# Patient Record
Sex: MALE | Race: WHITE | URBAN - METROPOLITAN AREA
[De-identification: names, ages, dates, MRNs, and addresses within clinical notes are randomized per-mention and may not be internally consistent; named-entity substitution may affect disease eponyms.]

---

## 2024-07-15 ENCOUNTER — OFFICE VISIT (OUTPATIENT)
Dept: FAMILY MEDICINE CLINIC | Facility: CLINIC | Age: 39
End: 2024-07-15

## 2024-07-15 VITALS
WEIGHT: 254.38 LBS | DIASTOLIC BLOOD PRESSURE: 86 MMHG | HEIGHT: 69 IN | HEART RATE: 94 BPM | RESPIRATION RATE: 16 BRPM | TEMPERATURE: 99 F | OXYGEN SATURATION: 98 % | SYSTOLIC BLOOD PRESSURE: 134 MMHG | BODY MASS INDEX: 37.68 KG/M2

## 2024-07-15 DIAGNOSIS — J02.9 SORE THROAT: ICD-10-CM

## 2024-07-15 DIAGNOSIS — J34.89 SINUS PRESSURE: ICD-10-CM

## 2024-07-15 DIAGNOSIS — H66.011 NON-RECURRENT ACUTE SUPPURATIVE OTITIS MEDIA OF RIGHT EAR WITH SPONTANEOUS RUPTURE OF TYMPANIC MEMBRANE: Primary | ICD-10-CM

## 2024-07-15 DIAGNOSIS — H93.8X2 EAR PRESSURE, LEFT: ICD-10-CM

## 2024-07-15 PROCEDURE — 99202 OFFICE O/P NEW SF 15 MIN: CPT | Performed by: NURSE PRACTITIONER

## 2024-07-15 RX ORDER — AMOXICILLIN AND CLAVULANATE POTASSIUM 875; 125 MG/1; MG/1
1 TABLET, FILM COATED ORAL 2 TIMES DAILY
Qty: 20 TABLET | Refills: 0 | Status: SHIPPED | OUTPATIENT
Start: 2024-07-15 | End: 2024-07-25

## 2024-07-15 NOTE — PATIENT INSTRUCTIONS
Take Augmentin antibiotic as prescribed - finish all 10 days  Motrin/Tylenol as needed for pain  Push fluids  Return to care for new/worsening symptoms

## 2024-07-15 NOTE — PROGRESS NOTES
Subjective:   Patient ID: Eugene Ng is a 38 year old male.    Patient is a 38 year old male who presents today with complaints of sore throat and sinus pressure x 5 days. Yesterday developed right ear pressure and bloody discharge. This morning notes ear pain is worsening, continues to have clear discharge and now having left ear pressure. Noticed white spots in the back of his throat this morning. Denies fevers, body aches, chills, cough, SOB, chest pain, congestion, runny nose, n/v/d or abdominal pain. Tolerating PO well at home. Attempted treatment prior to arrival = Motrin and Ami Franklin. No ill contacts in the home.         History/Other:   Review of Systems   Constitutional:  Negative for appetite change, chills and fever.   HENT:  Positive for ear discharge, ear pain (pressure), sinus pressure and sore throat. Negative for congestion and rhinorrhea.    Respiratory:  Negative for cough and shortness of breath.    Cardiovascular:  Negative for chest pain.   Gastrointestinal:  Negative for abdominal pain, diarrhea, nausea and vomiting.   Musculoskeletal:  Negative for myalgias.     Current Outpatient Medications   Medication Sig Dispense Refill    amoxicillin clavulanate 875-125 MG Oral Tab Take 1 tablet by mouth 2 (two) times daily for 10 days. 20 tablet 0     Allergies:No Known Allergies    /86   Pulse 94   Temp 98.5 °F (36.9 °C)   Resp 16   Ht 5' 9\" (1.753 m)   Wt 254 lb 6.4 oz (115.4 kg)   SpO2 98%   BMI 37.57 kg/m²       Objective:   Physical Exam  Vitals reviewed.   Constitutional:       General: He is awake. He is not in acute distress.     Appearance: Normal appearance. He is well-developed and well-groomed. He is not ill-appearing, toxic-appearing or diaphoretic.   HENT:      Head: Normocephalic and atraumatic.      Right Ear: Ear canal and external ear normal. Tympanic membrane is injected, erythematous and bulging.      Left Ear: Tympanic membrane, ear canal and external ear normal.       Ears:      Comments: Unable to visualize any perforation however there is some fluid sitting in the back of the ear canal against the TM. No erythema, debris or tenderness to canal.      Nose: Nose normal.      Mouth/Throat:      Lips: Pink.      Mouth: Mucous membranes are moist.      Pharynx: Oropharynx is clear. Uvula midline. Posterior oropharyngeal erythema and uvula swelling present. No pharyngeal swelling or oropharyngeal exudate.      Tonsils: No tonsillar exudate. 2+ on the right. 2+ on the left.      Comments: Erythema and exudate noted to uvula  Cardiovascular:      Rate and Rhythm: Normal rate and regular rhythm.   Pulmonary:      Effort: Pulmonary effort is normal. No respiratory distress.      Breath sounds: Normal breath sounds and air entry. No decreased breath sounds, wheezing, rhonchi or rales.   Lymphadenopathy:      Cervical: No cervical adenopathy.   Skin:     General: Skin is warm and dry.   Neurological:      Mental Status: He is alert and oriented to person, place, and time.   Psychiatric:         Behavior: Behavior is cooperative.         Assessment & Plan:   1. Non-recurrent acute suppurative otitis media of right ear with spontaneous rupture of tympanic membrane    2. Sore throat    3. Sinus pressure    4. Ear pressure, left        No orders of the defined types were placed in this encounter.      Meds This Visit:  Requested Prescriptions     Signed Prescriptions Disp Refills    amoxicillin clavulanate 875-125 MG Oral Tab 20 tablet 0     Sig: Take 1 tablet by mouth 2 (two) times daily for 10 days.     Offered rapid strep test - patient declines.  Reassuring physical exam findings. Vitals WNL.   Right ear exam consistent with AOM. Questionable perforated TM? There is scant amount of clear fluid in canal. No obvious signs of AOE. No recent swimming.  START Augmentin today.   Supportive care and return to care measures reviewed.  Patient v/u and is comfortable with this plan.    Patient  Instructions   Take Augmentin antibiotic as prescribed - finish all 10 days  Motrin/Tylenol as needed for pain  Push fluids  Return to care for new/worsening symptoms

## 2025-07-30 ENCOUNTER — OFFICE VISIT (OUTPATIENT)
Dept: FAMILY MEDICINE CLINIC | Facility: CLINIC | Age: 40
End: 2025-07-30
Payer: COMMERCIAL

## 2025-07-30 VITALS
SYSTOLIC BLOOD PRESSURE: 136 MMHG | HEIGHT: 69 IN | BODY MASS INDEX: 38.95 KG/M2 | WEIGHT: 263 LBS | OXYGEN SATURATION: 97 % | DIASTOLIC BLOOD PRESSURE: 86 MMHG | RESPIRATION RATE: 16 BRPM | HEART RATE: 99 BPM | TEMPERATURE: 98 F

## 2025-07-30 DIAGNOSIS — L25.5 CONTACT DERMATITIS DUE TO PLANT: Primary | ICD-10-CM

## 2025-07-30 DIAGNOSIS — L01.00 IMPETIGO: ICD-10-CM

## 2025-07-30 PROCEDURE — 99213 OFFICE O/P EST LOW 20 MIN: CPT | Performed by: NURSE PRACTITIONER

## 2025-07-30 PROCEDURE — 87070 CULTURE OTHR SPECIMN AEROBIC: CPT | Performed by: NURSE PRACTITIONER

## 2025-07-30 PROCEDURE — 87077 CULTURE AEROBIC IDENTIFY: CPT | Performed by: NURSE PRACTITIONER

## 2025-07-30 PROCEDURE — 87205 SMEAR GRAM STAIN: CPT | Performed by: NURSE PRACTITIONER

## 2025-07-30 RX ORDER — MUPIROCIN 2 %
OINTMENT (GRAM) TOPICAL
Qty: 22 G | Refills: 0 | Status: SHIPPED | OUTPATIENT
Start: 2025-07-30

## 2025-07-30 RX ORDER — SULFAMETHOXAZOLE AND TRIMETHOPRIM 800; 160 MG/1; MG/1
1 TABLET ORAL 2 TIMES DAILY
Qty: 10 TABLET | Refills: 0 | Status: SHIPPED | OUTPATIENT
Start: 2025-07-30 | End: 2025-08-04

## 2025-07-30 RX ORDER — PREDNISONE 20 MG/1
TABLET ORAL
Qty: 18 TABLET | Refills: 0 | Status: SHIPPED | OUTPATIENT
Start: 2025-07-30

## 2025-08-01 ENCOUNTER — RESULTS FOLLOW-UP (OUTPATIENT)
Dept: FAMILY MEDICINE CLINIC | Facility: CLINIC | Age: 40
End: 2025-08-01

## 2025-08-01 DIAGNOSIS — R89.5 POSITIVE CULTURE FINDING: Primary | ICD-10-CM

## 2025-08-02 ENCOUNTER — TELEPHONE (OUTPATIENT)
Dept: FAMILY MEDICINE CLINIC | Facility: CLINIC | Age: 40
End: 2025-08-02

## 2025-08-02 RX ORDER — CEFADROXIL 500 MG/1
500 CAPSULE ORAL 2 TIMES DAILY
Qty: 14 CAPSULE | Refills: 0 | Status: SHIPPED | OUTPATIENT
Start: 2025-08-02 | End: 2025-08-09